# Patient Record
Sex: FEMALE | Race: BLACK OR AFRICAN AMERICAN | NOT HISPANIC OR LATINO | Employment: UNEMPLOYED | URBAN - METROPOLITAN AREA
[De-identification: names, ages, dates, MRNs, and addresses within clinical notes are randomized per-mention and may not be internally consistent; named-entity substitution may affect disease eponyms.]

---

## 2022-07-08 ENCOUNTER — TELEPHONE (OUTPATIENT)
Dept: ENDOCRINOLOGY | Facility: CLINIC | Age: 67
End: 2022-07-08
Payer: COMMERCIAL

## 2022-07-08 NOTE — TELEPHONE ENCOUNTER
----- Message from Wild Quiñonez sent at 7/8/2022  3:18 PM CDT -----  Type:  Sooner Appointment Request    Caller is requesting a sooner appointment.  Caller declined first available appointment listed below.  Caller will not accept being placed on the waitlist and is requesting a message be sent to doctor.    Name of Caller:  pt  When is the first available appointment?  11/23  Symptoms:  Thyroid and metabolism/ est care/ Rx Refills/ Transfer from [facility] Call Back Number:  [phone]     Additional Information:  Please advise -- Thank you

## 2022-07-08 NOTE — TELEPHONE ENCOUNTER
S/w pt and notified her her appt w/Dr. Bray is on 11/23. She wanted refills. Advised her to call her PCP to see if she can refilled them    Pt. Verb understanding